# Patient Record
Sex: FEMALE | Race: BLACK OR AFRICAN AMERICAN | NOT HISPANIC OR LATINO | Employment: OTHER | URBAN - METROPOLITAN AREA
[De-identification: names, ages, dates, MRNs, and addresses within clinical notes are randomized per-mention and may not be internally consistent; named-entity substitution may affect disease eponyms.]

---

## 2017-05-05 ENCOUNTER — OFFICE VISIT (OUTPATIENT)
Dept: NEUROLOGY | Facility: CLINIC | Age: 52
End: 2017-05-05
Payer: OTHER GOVERNMENT

## 2017-05-05 ENCOUNTER — LAB VISIT (OUTPATIENT)
Dept: LAB | Facility: HOSPITAL | Age: 52
End: 2017-05-05
Attending: PSYCHIATRY & NEUROLOGY
Payer: OTHER GOVERNMENT

## 2017-05-05 VITALS
HEART RATE: 70 BPM | BODY MASS INDEX: 30.07 KG/M2 | DIASTOLIC BLOOD PRESSURE: 76 MMHG | SYSTOLIC BLOOD PRESSURE: 124 MMHG | HEIGHT: 62 IN | WEIGHT: 163.38 LBS | RESPIRATION RATE: 16 BRPM

## 2017-05-05 DIAGNOSIS — G62.9 POLYNEUROPATHY: ICD-10-CM

## 2017-05-05 DIAGNOSIS — E53.8 B12 DEFICIENCY: ICD-10-CM

## 2017-05-05 DIAGNOSIS — E23.6 PITUITARY CYST: Primary | ICD-10-CM

## 2017-05-05 DIAGNOSIS — M54.17 LUMBOSACRAL RADICULOPATHY AT L5: ICD-10-CM

## 2017-05-05 LAB — VIT B12 SERPL-MCNC: >2000 PG/ML

## 2017-05-05 PROCEDURE — 36415 COLL VENOUS BLD VENIPUNCTURE: CPT

## 2017-05-05 PROCEDURE — 99999 PR PBB SHADOW E&M-EST. PATIENT-LVL III: CPT | Mod: PBBFAC,,, | Performed by: PSYCHIATRY & NEUROLOGY

## 2017-05-05 PROCEDURE — 99213 OFFICE O/P EST LOW 20 MIN: CPT | Mod: PBBFAC | Performed by: PSYCHIATRY & NEUROLOGY

## 2017-05-05 PROCEDURE — 82607 VITAMIN B-12: CPT

## 2017-05-05 PROCEDURE — 99214 OFFICE O/P EST MOD 30 MIN: CPT | Mod: S$PBB,,, | Performed by: PSYCHIATRY & NEUROLOGY

## 2017-05-05 NOTE — PROGRESS NOTES
HPI:Krystal Centeno is a 51 y.o. female with left 4th finger pain and numbness progressing to left hand (especially medial hand/forearm) numbness. Exam showed left arm sensory changes. Some cervical radicular symptoms possible and dorsal hand cyst which is resolved.   2015 EMG left arm normal.   MRI C spine shows no NF narrowing or stenosis. 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 She had a focal injection with that provider which helps.  She has some back pain again and her neuropathy pain is still active- she was given capsaicin cream by another provider which helps somewhat  She feels some difficulty with balance and some difficulty with rising off of a floor with her symptoms over time  [facility] and she is [facility] 300mg Gabapentin TID which helps her pain  Patient's son has myotonic dystrophy thought to be paternally inherited (his father had this condition)      Review of Systems   Constitutional: Negative for fever.   HENT: Negative for nosebleeds.    Eyes: Negative for double vision.   Respiratory: Negative for hemoptysis.    Cardiovascular: Negative for chest pain.   Gastrointestinal: Negative for blood in stool.   Genitourinary: Negative for hematuria.   Musculoskeletal: Positive for back pain. Negative for falls.   Skin: Negative for rash.   Neurological: Negative for tremors and seizures.   Psychiatric/Behavioral: The patient does not have insomnia.             Exam:  Gen Appearance, well developed/nourished in no apparent distress  CV: 2+ distal pulses with no edema or swelling  Neuro:  MS: Awake, alert,  Sustains attention. Recent/remote memory  intact, Language is full to spontaneous speech/naming/comprehension. Fund of Knowledge is full  CN: Optic discs are flat with normal vasculature, PERRL, Extraoccular movements and visual fields are full. Normal facial sensation and strength,  Tongue and Palate are midline and strong. Shoulder Shrug symmetric and strong.  Motor: Normal bulk, tone, no abnormal movements. 5/5 strength bilateral upper/lower extremities with 1+ reflexes  Sensory: symmetric to temp, and vibration.   Cerebellar: Finger-nose,Heal-shin, Rapid alternating movements intact  Gait: normal stance, no ataxia but some difficulty with straight leg raising    ?cystic lesion dorsal hand seen at a last visit,again not seen today      5/2016 MRI brain (repeat study) Stable cystic structure within the posterior sella causing some mild mass effect upon the adjacent pituitary gland, measuring approximately 5.5 x 3.2 mm.  Findings are most suggestive for either a Rathke's cleft cyst versus a cystic pituitary microadenoma.    Age advanced flair hyperintensity throughout the supratentorial white matter the differential to include sequela of chronic microvascular ischemic disease, vasculitis or other demyelinating disease process.      MRI C spine: Mild spondylosis of the cervical spine without evidence for central canal stenosis or neural foraminal narrowing.    The marrow signal is slightly heterogenous on the T1-weighted images. This is a nonspecific finding and can be seen in marrow replacement processes are well defined disease states such as anemia. Correlation with lab reported it is is recommended.        EMG: Normal EMG of the left arm and normal NCS of the   bilateral arms. No evidence of entrapement neuropathy, plexopathy   or radiculopathy on this study    4/2016 EMG/NCS: Impression:  Abnormal Study secondary to the Presence of:    1. 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 Lumbar Radiculopathy at L5 Bilaterally     1/2016 MRI  "L spine: Disc disease at L5    Labs:11/2016: Pituitary labs all normal    Assessment/Plan: Krystal Centeno is a 51 y.o. female with left 4th finger pain and numbness progressing to left hand (especially medial hand/forearm) numbness. Exam showed left arm sensory changes. Some cervical radicular symptoms possible and dorsal hand cyst which is resolved.   2015 EMG left arm normal.   MRI C spine shows no NF narrowing or stenosis. 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 Patient has started po B12 for low normal B12 levels since the neuropathy has been found- Check B12 level today  2.  In addition to this, some rheumatological diagnosis could be   associated with neuropathy.  She is following with rheumatology for "rheumatism- unspecified fibrositis"- injections focally with rheumatology helps  3. She is now s/p Lumbar surgery and initially  Her symptoms of numbness in the LE improved after back surgery.  Patient is on pain medication/ gabapentin for chronic lower back pain and prior left leg pain with pain management.   4. She can return to PT at any point for any worsening gait or balance.  4. C spine MRI reassuring and MRI L spine shows disc disease at L5 2016   5. Pituitary labs normal 2016. Cystic structure in pituitary is stable and could be Rathke's cleft cyst versus a cystic pituitary microadenoma.  By 5/2016 MRI white matter lesions are stable. These are likely incidental findings. Repeat MRI in 2-3 years to ensure no new changes.   6. Dorsal hand cystic lesion appears resolved. Patient's son has myotonic dystrophy thought to be paternally inherited (his father had this condition and patient reports she tested negative)      RTC in 6 months                  "

## 2017-05-05 NOTE — MR AVS SNAPSHOT
[facility]  [address] [address]  Phone: [phone]  Fax: [phone]                  Krystal Centeno   2017 8:00 AM   Office Visit    Description:  Female : 1965   Provider:  Faizan Ryan MD   Department:  [facility]           Reason for Visit     Neurologic Problem           Diagnoses this Visit        Comments    Pituitary cyst    -  Primary     Polyneuropathy         Lumbosacral radiculopathy at L5         B12 deficiency                To Do List           Future Appointments        Provider Department Dept Phone    2017 8:45 AM Faizan Ryan MD [facility] [phone]      Goals (5 Years of Data)     None      Follow-Up and Disposition     Return in about 6 months (around 2017).      [facility] On Call     [facility] On Call Nurse Care Line - 24 Assistance  Unless otherwise directed by your provider, please contact [facility], our nurse care line that is available for  assistance.     Registered nurses in the [facility] On Call Center provide: appointment scheduling, clinical advisement, health education, and other advisory services.  Call: [phone] (toll free)               Medications           Message regarding Medications     Verify the changes and/or additions to your medication regime listed below are the same as discussed with your clinician today.  If any of these changes or additions are incorrect, please notify your healthcare provider.        STOP taking these medications     nifedipine 30 MG ORAL TR24 (PROCARDIA-XL) 30 MG (OSM) 24 hr tablet Take 30 mg by mouth once daily.           Verify that the below list of medications is an accurate representation of the medications you are currently taking.  If none reported, the list may be blank. If incorrect, please contact your healthcare provider. Carry this list with you in case of emergency.           Current Medications      "alendronate (FOSAMAX) 10 MG Tab Take 5 mg by mouth once daily.    aspirin (ECOTRIN) 81 MG EC tablet Take 81 mg by mouth once daily.    atenolol (TENORMIN) 50 MG tablet Take 50 mg by mouth once daily.    CALCIUM CARBONATE/VITAMIN D3 (CALCIUM WITH VITAMIN D ORAL) Take 2 tablets by mouth 2 (two) times daily.    ERGOCALCIFEROL, VITAMIN D2, (VITAMIN D ORAL) Take 300 mg by mouth once daily.    esomeprazole (NEXIUM) 20 MG capsule Take 20 mg by mouth once daily.    ESTRACE 0.01 % (0.1 mg/gram) vaginal cream PLACE 0.5 GRAM VAGINALLY TWICE WEEKLY    folic acid (FOLVITE) 1 MG tablet Take 1 mg by mouth once daily.    gabapentin (NEURONTIN) 300 MG capsule Take 300 mg by mouth 4 (four) times daily.    hydrocodone-acetaminophen 5-325mg (NORCO) 5-325 mg per tablet Take 1 tablet by mouth 2 (two) times daily.    loratadine (CLARITIN) 10 mg tablet Take 10 mg by mouth once daily.     meloxicam (MOBIC) 7.5 MG tablet Take 7.5 mg by mouth once daily.    simvastatin (ZOCOR) 40 MG tablet Take 40 mg by mouth every evening.           Clinical Reference Information           Your Vitals Were     BP Pulse Resp Height Weight BMI    124/76 (BP Location: Right arm, Patient Position: Sitting, BP Method: Manual) 70 16 5' 2" (1.575 m) 74.1 kg (163 lb 5.8 oz) 29.88 kg/m2      Blood Pressure          Most Recent Value    BP  124/76      Allergies as of 5/5/2017     Cyclobenzaprine    Bactrim [Sulfamethoxazole-trimethoprim]      Immunizations Administered on Date of Encounter - 5/5/2017     None      Orders Placed During Today's Visit     Future Labs/Procedures Expected by Expires    Vitamin B12  5/5/2017 5/5/2018      Language Assistance Services     ATTENTION: Language assistance services are available, free of charge. Please call [phone].      ATENCIÓN: Si habla guillaume, tiene a vega disposición servicios gratuitos de asistencia lingüística. Llame al [phone].     CHÚ Ý: N?u b?n nói Ti?ng Vi?t, có các d?ch v? h? tr? ngôn ng? mi?n johnathon mchugh " shannon geller?n. G?i s? [phone].         [facility] complies with applicable Federal civil rights laws and does not discriminate on the basis of race, color, national origin, age, disability, or sex.

## 2017-11-06 ENCOUNTER — OFFICE VISIT (OUTPATIENT)
Dept: NEUROLOGY | Facility: CLINIC | Age: 52
End: 2017-11-06
Payer: OTHER GOVERNMENT

## 2017-11-06 VITALS
SYSTOLIC BLOOD PRESSURE: 122 MMHG | HEART RATE: 80 BPM | BODY MASS INDEX: 30.67 KG/M2 | RESPIRATION RATE: 14 BRPM | WEIGHT: 166.69 LBS | DIASTOLIC BLOOD PRESSURE: 64 MMHG | HEIGHT: 62 IN

## 2017-11-06 DIAGNOSIS — G62.9 POLYNEUROPATHY: ICD-10-CM

## 2017-11-06 DIAGNOSIS — M79.7 FIBROSITIS: ICD-10-CM

## 2017-11-06 DIAGNOSIS — E23.6 PITUITARY CYST: Primary | ICD-10-CM

## 2017-11-06 DIAGNOSIS — E53.8 B12 DEFICIENCY: ICD-10-CM

## 2017-11-06 DIAGNOSIS — M54.17 LUMBOSACRAL RADICULOPATHY AT L5: ICD-10-CM

## 2017-11-06 PROCEDURE — 99999 PR PBB SHADOW E&M-EST. PATIENT-LVL III: CPT | Mod: PBBFAC,,, | Performed by: PSYCHIATRY & NEUROLOGY

## 2017-11-06 PROCEDURE — 99214 OFFICE O/P EST MOD 30 MIN: CPT | Mod: S$PBB,,, | Performed by: PSYCHIATRY & NEUROLOGY

## 2017-11-06 PROCEDURE — 99213 OFFICE O/P EST LOW 20 MIN: CPT | Mod: PBBFAC | Performed by: PSYCHIATRY & NEUROLOGY

## 2017-11-06 RX ORDER — CITALOPRAM 20 MG/1
20 TABLET, FILM COATED ORAL DAILY
COMMUNITY
End: 2017-12-21 | Stop reason: ALTCHOICE

## 2017-11-06 RX ORDER — PRAVASTATIN SODIUM 80 MG/1
80 TABLET ORAL DAILY
COMMUNITY
End: 2019-11-11 | Stop reason: DRUGHIGH

## 2017-11-06 RX ORDER — DICLOFENAC SODIUM 10 MG/G
2 GEL TOPICAL 4 TIMES DAILY
COMMUNITY

## 2017-11-06 RX ORDER — PANTOPRAZOLE SODIUM 40 MG/1
40 TABLET, DELAYED RELEASE ORAL DAILY
COMMUNITY

## 2017-11-06 RX ORDER — NIFEDIPINE 30 MG/1
30 TABLET, FILM COATED, EXTENDED RELEASE ORAL DAILY
COMMUNITY
End: 2018-11-06

## 2017-11-06 NOTE — PROGRESS NOTES
"HPI:Krystal Centeno is a 52 y.o. female with left 4th finger pain and numbness progressing to left hand (especially medial hand/forearm) numbness. Exam showed left arm sensory changes. Some cervical radicular symptoms possible and dorsal hand cyst which is resolved.   2015 EMG left arm normal.   MRI C spine shows no NF narrowing or stenosis. 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 However, she has been gaining weight but stopped off gabapentin and this did not help her loose weight and she could not tolerate off this med.   She states the numbness in the hand is more "pain with weather changes" which occurs in both hands. She is getting relief with injections with Dr Bejarano, rheumatology  She is no longer on hydrocodone for leg pain and [facility] is [facility] lidocaine patches, diclofenac gel,  and mobic for her pain PRN  She reports her balance is improved. The numbness in her feet is usually in the am only and improves during the day.   No nipple discharge, no frequent headaches.    Review of Systems   Constitutional: Negative for fever.   HENT: Negative for nosebleeds.    Eyes: Negative for double vision.   Respiratory: Negative for hemoptysis.    Cardiovascular: Negative for chest pain.   Gastrointestinal: Negative for blood in stool.   Genitourinary: Negative for hematuria.   Musculoskeletal: Positive for back pain. Negative for falls.   Skin: Negative for rash.   Neurological: Negative for tremors.   Psychiatric/Behavioral: Negative for memory loss.       Exam:  Gen Appearance, well developed/nourished in no apparent distress  CV: 2+ distal pulses with no edema or swelling  Neuro:  MS: Awake, alert,  Sustains attention. " Recent/remote memory intact, Language is full to spontaneous speech/naming/comprehension. Fund of Knowledge is full  CN: Optic discs are flat with normal vasculature, PERRL, Extraoccular movements and visual fields are full. Normal facial sensation and strength, chronic right sided hearing loss (seen by ENT prior/ diagnosed with Meniere's disease)  Tongue and Palate are midline and strong. Shoulder Shrug symmetric and strong.  Motor: Normal bulk, tone, no abnormal movements. 5/5 strength bilateral upper/lower extremities with 1+ reflexes  Sensory: symmetric to temp, and vibration.   Cerebellar: Finger-nose,Heal-shin, Rapid alternating movements intact  Gait: normal stance, no ataxia     ?cystic lesion dorsal hand seen at a last visit,again not seen today      5/2016 MRI brain (repeat study) Stable cystic structure within the posterior sella causing some mild mass effect upon the adjacent pituitary gland, measuring approximately 5.5 x 3.2 mm.  Findings are most suggestive for either a Rathke's cleft cyst versus a cystic pituitary microadenoma.    Age advanced flair hyperintensity throughout the supratentorial white matter the differential to include sequela of chronic microvascular ischemic disease, vasculitis or other demyelinating disease process.      MRI C spine: Mild spondylosis of the cervical spine without evidence for central canal stenosis or neural foraminal narrowing.    The marrow signal is slightly heterogenous on the T1-weighted images. This is a nonspecific finding and can be seen in marrow replacement processes are well defined disease states such as anemia. Correlation with lab reported it is is recommended.        EMG: Normal EMG of the left arm and normal NCS of the   bilateral arms. No evidence of entrapement neuropathy, plexopathy   or radiculopathy on this study    4/2016 EMG/NCS: Impression:  Abnormal Study secondary to the Presence of:    1. Mild sensory and Motor Axonal Polyneuropathy in the  "feet  2. Lumbar Radiculopathy at L5 Bilaterally     1/2016 MRI L spine: Disc disease at L5    Labs:5/2017 B12 well corrected    Assessment/Plan: Krystal Centeno is a 52 y.o. female with left 4th finger pain and numbness progressing to left hand (especially medial hand/forearm) numbness. Exam showed left arm sensory changes. Some cervical radicular symptoms possible and dorsal hand cyst which is resolved.   2015 EMG left arm normal.   MRI C spine shows no NF narrowing or stenosis. 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 Patient has started po B12 for low normal B12 levels since the neuropathy has been found- Check B12 level today  2.  In addition to this, some rheumatological diagnosis could be   associated with neuropathy.  She is following with rheumatology for "rheumatism- unspecified fibrositis"- injections focally with rheumatology helps her hand pain (which is pain more than numbness at this point)  3. Initially , her symptoms of numbness in the LE improved after back surgery.  She [facility].   4. She can return to PT at any point for any worsening gait or balance which is improved now  5. She uses gabapentin long term with good relief of LE symptoms.   6. Pituitary labs normal 2016. Cystic structure in pituitary is stable and could be Rathke's cleft cyst versus a cystic pituitary microadenoma.  By 5/2016 MRI white matter lesions are stable. These are likely incidental findings. Repeat MRI in 2-3 years (1924-2608) to ensure no new changes.   7. Dorsal hand cystic lesion is episodic and long standing. Could see hand surgeon at any point if desired/ if persistent. Patient's son has myotonic dystrophy thought to be paternally " inherited (his father had this condition and patient reports she tested negative)    RTC in 1 year

## 2017-12-18 ENCOUNTER — TELEPHONE (OUTPATIENT)
Dept: NEUROLOGY | Facility: CLINIC | Age: 52
End: 2017-12-18

## 2017-12-18 NOTE — TELEPHONE ENCOUNTER
It could be that or her neuropathy. If she is still taking gabapentin as prior and this does not improve soon, she will need to be seen. Me or Shae.  Thanks.

## 2017-12-18 NOTE — TELEPHONE ENCOUNTER
----- Message from Willa Sousa MA sent at 2017 10:50 AM CST -----  Contact: Patient  Krystal Centeno  MRN: [id number]  : 1965  PCP: Dominga Gold  Home Phone      [phone]  Work Phone      Not on file.  Mobile          [phone]      MESSAGE:  Patient states she is having some pain in her feet. She is not due to come back to clinic until another 5 months. Patient is asking for a return her call to [phone].

## 2017-12-18 NOTE — TELEPHONE ENCOUNTER
Offered patient appointment for tomorrow was unable to make it, scheduled for Thursday she will cancel should her symptoms improve.

## 2017-12-18 NOTE — TELEPHONE ENCOUNTER
Spoke with patient she states that for the last couple days her feet feel like two big puffy globs of pain progressively getting worse. She states that [facility] who told her that there was nothing wrong with her feet that her problems must be stemming from her back.

## 2017-12-21 ENCOUNTER — OFFICE VISIT (OUTPATIENT)
Dept: NEUROLOGY | Facility: CLINIC | Age: 52
End: 2017-12-21
Payer: OTHER GOVERNMENT

## 2017-12-21 VITALS
HEIGHT: 63 IN | BODY MASS INDEX: 29.61 KG/M2 | HEART RATE: 80 BPM | RESPIRATION RATE: 16 BRPM | SYSTOLIC BLOOD PRESSURE: 128 MMHG | WEIGHT: 167.13 LBS | DIASTOLIC BLOOD PRESSURE: 78 MMHG

## 2017-12-21 DIAGNOSIS — D35.2 PITUITARY ADENOMA: ICD-10-CM

## 2017-12-21 DIAGNOSIS — G89.29 CHRONIC LOW BACK PAIN WITHOUT SCIATICA, UNSPECIFIED BACK PAIN LATERALITY: Primary | ICD-10-CM

## 2017-12-21 DIAGNOSIS — M54.50 CHRONIC LOW BACK PAIN WITHOUT SCIATICA, UNSPECIFIED BACK PAIN LATERALITY: Primary | ICD-10-CM

## 2017-12-21 DIAGNOSIS — G62.9 POLYNEUROPATHY: ICD-10-CM

## 2017-12-21 DIAGNOSIS — F41.8 DEPRESSION WITH ANXIETY: ICD-10-CM

## 2017-12-21 PROCEDURE — 99214 OFFICE O/P EST MOD 30 MIN: CPT | Mod: PBBFAC | Performed by: NURSE PRACTITIONER

## 2017-12-21 PROCEDURE — 99214 OFFICE O/P EST MOD 30 MIN: CPT | Mod: S$PBB,,, | Performed by: NURSE PRACTITIONER

## 2017-12-21 PROCEDURE — 99999 PR PBB SHADOW E&M-EST. PATIENT-LVL IV: CPT | Mod: PBBFAC,,, | Performed by: NURSE PRACTITIONER

## 2017-12-21 RX ORDER — DULOXETIN HYDROCHLORIDE 30 MG/1
30 CAPSULE, DELAYED RELEASE ORAL DAILY
Qty: 14 CAPSULE | Refills: 0 | Status: SHIPPED | OUTPATIENT
Start: 2017-12-21 | End: 2019-11-11 | Stop reason: DRUGHIGH

## 2017-12-21 RX ORDER — DULOXETIN HYDROCHLORIDE 60 MG/1
60 CAPSULE, DELAYED RELEASE ORAL DAILY
Qty: 30 CAPSULE | Refills: 11 | Status: SHIPPED | OUTPATIENT
Start: 2017-12-21 | End: 2017-12-21 | Stop reason: SDUPTHER

## 2017-12-21 RX ORDER — DULOXETIN HYDROCHLORIDE 60 MG/1
60 CAPSULE, DELAYED RELEASE ORAL DAILY
Qty: 30 CAPSULE | Refills: 11 | Status: SHIPPED | OUTPATIENT
Start: 2017-12-21 | End: 2018-11-06 | Stop reason: DRUGHIGH

## 2017-12-21 NOTE — PATIENT INSTRUCTIONS
Start Duloxetine 30 mg 1 capsule each day for 2 weeks.   Then increase to 60 mg capsule afterwards.     To wean Celexa (Citalopram):  Take 1/2 tablet for 2 weeks, then stop.

## 2017-12-21 NOTE — PROGRESS NOTES
HPI:Krystal Centeno is a 52 y.o. female with left 4th finger pain and numbness progressing to left hand (especially medial hand/forearm) numbness. Exam showed left arm sensory changes. Some cervical radicular symptoms possible and dorsal hand cyst which is resolved. 2015 EMG left arm normal. MRI C spine shows no NF narrowing or stenosis. 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     She presents today with complaint of worsening of her bilateral foot pain and numbness. She has more foot pain during her initial steps after getting up to walk. She has to dangle her feet over the side of the bed for some time upon waking to gain more feeling in them.     Her hand pain is managed with injections per Rheumatology.     Lumbar pain ongoing, but managed with [facility].    She denies nipple discharge/lactation.    Review of Systems   Constitutional: Negative for fever.   HENT: Negative for nosebleeds.    Eyes: Negative for double vision.   Respiratory: Negative for hemoptysis.    Cardiovascular: Negative for chest pain.   Gastrointestinal: Negative for blood in stool.   Genitourinary: Negative for hematuria.   Musculoskeletal: Positive for back pain and joint pain. Negative for falls.   Skin: Negative for rash.   Neurological: Positive for sensory change. Negative for tremors.   Psychiatric/Behavioral: Negative for memory loss.     Exam:  Gen Appearance, well developed/nourished in no apparent distress  CV: 2+ distal pulses with no edema or swelling  Neuro:  MS: Awake, alert,  Sustains attention. Recent/remote memory intact, Language is full to spontaneous speech/naming/comprehension. Fund of Knowledge is full  CN: Optic discs are flat with normal vasculature, PERRL, Extraoccular movements and visual fields  are full. Normal facial sensation and strength, chronic right sided hearing loss (seen by ENT prior/ diagnosed with Meniere's disease)  Tongue and Palate are midline and strong. Shoulder Shrug symmetric and strong.  Motor: Normal bulk, tone, no abnormal movements. 5/5 strength bilateral upper/lower extremities with 1+ reflexes  Sensory: symmetric to temp, and vibration.   Cerebellar: Finger-nose,Heal-shin, Rapid alternating movements intact  Gait: normal stance, no ataxia   MSK Survey:?cystic lesion dorsal hand seen at a last visit,again not seen today    Imagin2016 MRI brain (repeat study) Stable cystic structure within the posterior sella causing some mild mass effect upon the adjacent pituitary gland, measuring approximately 5.5 x 3.2 mm.  Findings are most suggestive for either a Rathke's cleft cyst versus a cystic pituitary microadenoma.    Age advanced flair hyperintensity throughout the supratentorial white matter the differential to include sequela of chronic microvascular ischemic disease, vasculitis or other demyelinating disease process.    MRI C spine 2015: Mild spondylosis of the cervical spine without evidence for central canal stenosis or neural foraminal narrowing.    The marrow signal is slightly heterogenous on the T1-weighted images. This is a nonspecific finding and can be seen in marrow replacement processes are well defined disease states such as anemia. Correlation with lab reported it is is recommended.    2016 MRI L spine: Disc disease at L5    EMG 2015: Normal EMG of the left arm and normal NCS of the   bilateral arms. No evidence of entrapement neuropathy, plexopathy   or radiculopathy on this study    2016 EMG/NCS: Impression:  Abnormal Study secondary to the Presence of:    1. 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 Lumbar Radiculopathy at L5 Bilaterally     Labs: 2017 B12 well corrected    Assessment/Plan: Krystal Centeno is a 52 y.o. female  "with left 4th finger pain and numbness progressing to left hand (especially medial hand/forearm) numbness. Exam showed left arm sensory changes. Some cervical radicular symptoms possible and dorsal hand cyst which is resolved. 2015 EMG left arm normal. MRI C spine shows no NF narrowing or stenosis. 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 And Lumbar Radiculopathy at L5 Bilaterally--now S/p L5 Surgery in 8/2016      I recommend:   1. Wean Celexa and start Cymbalta for neuropathic complaints, as well as her anxiety and depression. Continue Gabapentin for neuropathy, as well as LE pain.   2. Continue B12 supplementation, and check level at next visit.   3. Some rheumatological diagnosis could be   associated with neuropathy. She is following with rheumatology for "rheumatism- unspecified fibrositis"- injections focally with rheumatology helps her hand pain (which is pain more than numbness at this point).  4. Initially , her symptoms of numbness in the LE improved after back surgery.  She is [facility].   5. She can return to PT at any point for any worsening gait or balance which is improved now.  6. Pituitary labs normal 2016. Cystic structure in pituitary is stable and could be Rathke's cleft cyst versus a cystic pituitary microadenoma.  By 5/2016 MRI white matter lesions are stable. These are likely incidental findings. Repeat MRI in 2-3 years ([id number]) to ensure no new changes.   7. Dorsal hand cystic lesion is episodic and long standing. Could see hand surgeon at any point if desired/ if persistent. Patient's son has myotonic dystrophy thought to be paternally inherited (his father had this condition and patient reports she tested negative).    FU 2 months.                     "

## 2018-11-06 ENCOUNTER — OFFICE VISIT (OUTPATIENT)
Dept: NEUROLOGY | Facility: CLINIC | Age: 53
End: 2018-11-06
Payer: OTHER GOVERNMENT

## 2018-11-06 ENCOUNTER — LAB VISIT (OUTPATIENT)
Dept: LAB | Facility: HOSPITAL | Age: 53
End: 2018-11-06
Attending: NURSE PRACTITIONER
Payer: OTHER GOVERNMENT

## 2018-11-06 VITALS
SYSTOLIC BLOOD PRESSURE: 128 MMHG | WEIGHT: 179 LBS | DIASTOLIC BLOOD PRESSURE: 82 MMHG | HEIGHT: 62 IN | RESPIRATION RATE: 16 BRPM | BODY MASS INDEX: 32.94 KG/M2 | HEART RATE: 74 BPM

## 2018-11-06 DIAGNOSIS — D35.2 PITUITARY ADENOMA: ICD-10-CM

## 2018-11-06 DIAGNOSIS — E78.5 HYPERLIPIDEMIA, UNSPECIFIED HYPERLIPIDEMIA TYPE: ICD-10-CM

## 2018-11-06 DIAGNOSIS — G62.9 POLYNEUROPATHY: ICD-10-CM

## 2018-11-06 DIAGNOSIS — E53.8 B12 DEFICIENCY: ICD-10-CM

## 2018-11-06 DIAGNOSIS — D35.2 PITUITARY ADENOMA: Primary | ICD-10-CM

## 2018-11-06 DIAGNOSIS — F41.8 DEPRESSION WITH ANXIETY: ICD-10-CM

## 2018-11-06 DIAGNOSIS — I10 ESSENTIAL HYPERTENSION: ICD-10-CM

## 2018-11-06 DIAGNOSIS — M54.50 CHRONIC LOW BACK PAIN WITHOUT SCIATICA, UNSPECIFIED BACK PAIN LATERALITY: ICD-10-CM

## 2018-11-06 DIAGNOSIS — G89.29 CHRONIC LOW BACK PAIN WITHOUT SCIATICA, UNSPECIFIED BACK PAIN LATERALITY: ICD-10-CM

## 2018-11-06 LAB
ALBUMIN SERPL BCP-MCNC: 3.9 G/DL
ALP SERPL-CCNC: 60 U/L
ALT SERPL W/O P-5'-P-CCNC: 17 U/L
ANION GAP SERPL CALC-SCNC: 10 MMOL/L
AST SERPL-CCNC: 22 U/L
BASOPHILS # BLD AUTO: 0.02 K/UL
BASOPHILS NFR BLD: 0.3 %
BILIRUB SERPL-MCNC: 0.3 MG/DL
BUN SERPL-MCNC: 9 MG/DL
CALCIUM SERPL-MCNC: 9.7 MG/DL
CHLORIDE SERPL-SCNC: 102 MMOL/L
CO2 SERPL-SCNC: 26 MMOL/L
CREAT SERPL-MCNC: 0.8 MG/DL
DIFFERENTIAL METHOD: ABNORMAL
EOSINOPHIL # BLD AUTO: 0.2 K/UL
EOSINOPHIL NFR BLD: 3.6 %
ERYTHROCYTE [DISTWIDTH] IN BLOOD BY AUTOMATED COUNT: 13.8 %
EST. GFR  (AFRICAN AMERICAN): >60 ML/MIN/1.73 M^2
EST. GFR  (NON AFRICAN AMERICAN): >60 ML/MIN/1.73 M^2
GLUCOSE SERPL-MCNC: 95 MG/DL
HCT VFR BLD AUTO: 37.9 %
HGB BLD-MCNC: 12.2 G/DL
LYMPHOCYTES # BLD AUTO: 3.1 K/UL
LYMPHOCYTES NFR BLD: 45.9 %
MCH RBC QN AUTO: 29 PG
MCHC RBC AUTO-ENTMCNC: 32.2 G/DL
MCV RBC AUTO: 90 FL
MONOCYTES # BLD AUTO: 0.4 K/UL
MONOCYTES NFR BLD: 6 %
NEUTROPHILS # BLD AUTO: 3 K/UL
NEUTROPHILS NFR BLD: 44.2 %
PLATELET # BLD AUTO: 281 K/UL
PMV BLD AUTO: 8.5 FL
POTASSIUM SERPL-SCNC: 4.8 MMOL/L
PROT SERPL-MCNC: 7.9 G/DL
RBC # BLD AUTO: 4.2 M/UL
SODIUM SERPL-SCNC: 138 MMOL/L
T4 SERPL-MCNC: 8 UG/DL
TSH SERPL DL<=0.005 MIU/L-ACNC: 1.08 UIU/ML
WBC # BLD AUTO: 6.71 K/UL

## 2018-11-06 PROCEDURE — 99999 PR PBB SHADOW E&M-EST. PATIENT-LVL IV: CPT | Mod: PBBFAC,,, | Performed by: NURSE PRACTITIONER

## 2018-11-06 PROCEDURE — 83001 ASSAY OF GONADOTROPIN (FSH): CPT

## 2018-11-06 PROCEDURE — 80053 COMPREHEN METABOLIC PANEL: CPT

## 2018-11-06 PROCEDURE — 83002 ASSAY OF GONADOTROPIN (LH): CPT

## 2018-11-06 PROCEDURE — 36415 COLL VENOUS BLD VENIPUNCTURE: CPT

## 2018-11-06 PROCEDURE — 99214 OFFICE O/P EST MOD 30 MIN: CPT | Mod: PBBFAC | Performed by: NURSE PRACTITIONER

## 2018-11-06 PROCEDURE — 84436 ASSAY OF TOTAL THYROXINE: CPT

## 2018-11-06 PROCEDURE — 82024 ASSAY OF ACTH: CPT

## 2018-11-06 PROCEDURE — 99214 OFFICE O/P EST MOD 30 MIN: CPT | Mod: S$PBB,,, | Performed by: NURSE PRACTITIONER

## 2018-11-06 PROCEDURE — 85025 COMPLETE CBC W/AUTO DIFF WBC: CPT

## 2018-11-06 PROCEDURE — 82607 VITAMIN B-12: CPT

## 2018-11-06 PROCEDURE — 84146 ASSAY OF PROLACTIN: CPT

## 2018-11-06 PROCEDURE — 84443 ASSAY THYROID STIM HORMONE: CPT

## 2018-11-06 NOTE — PROGRESS NOTES
HPI:Krystal Centeno is a 53 y.o. female with left 4th finger pain and numbness progressing to left hand (especially medial hand/forearm) numbness. Exam showed left arm sensory changes. Some cervical radicular symptoms possible and dorsal hand cyst which is resolved. 2015 EMG left arm normal. MRI C spine shows no NF narrowing or stenosis. 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 She is retired [employer].     She presents today to [facility]. She was last seen in 12/2017, and had her Celexa changed to Cymbalta for better control of her neuropathic complaints, in addition to her anxiety and depression. She was lost to follow up afterwards. Foot pain is improved with the Cymbalta, and anxiety and depression are well controlled. She was unable to titrate to 60 mg, due to excessive fatigue.     Her hand pain is managed with injections per Rheumatology.     Lumbar pain ongoing, but [facility] Pain is worse with weather changes.     She denies nipple discharge/lactation at this time.     Review of Systems   Constitutional: Negative for fever.   HENT: Negative for nosebleeds.    Eyes: Negative for double vision.   Respiratory: Negative for hemoptysis.    Cardiovascular: Negative for chest pain.   Gastrointestinal: Negative for blood in stool.   Genitourinary: Negative for hematuria.   Musculoskeletal: Positive for back pain and joint pain. Negative for falls.   Skin: Negative for rash.   Neurological: Positive for sensory change. Negative for tremors.   Psychiatric/Behavioral: Negative for memory loss.     Exam:  Gen Appearance, well developed/nourished in no apparent distress  CV: 2+ distal pulses with no edema or swelling  Neuro:  MS: Awake, alert,  Sustains attention. Recent/remote memory  intact, Language is full to spontaneous speech/naming/comprehension. Fund of Knowledge is full  CN: Optic discs are flat with normal vasculature, PERRL, Extraoccular movements and visual fields are full. Normal facial sensation and strength, chronic right sided hearing loss (seen by ENT prior/ diagnosed with Meniere's disease)  Tongue and Palate are midline and strong. Shoulder Shrug symmetric and strong.  Motor: Normal bulk, tone, no abnormal movements. 5/5 strength bilateral upper/lower extremities with 1+ reflexes  Sensory: symmetric to temp, and vibration.   Cerebellar: Finger-nose,Heal-shin, Rapid alternating movements intact  Gait: normal stance, no ataxia   MSK Survey: ?cystic lesion dorsal hand seen at a last visit, again not seen today    Imagin2016 MRI brain (repeat study) Stable cystic structure within the posterior sella causing some mild mass effect upon the adjacent pituitary gland, measuring approximately 5.5 x 3.2 mm.  Findings are most suggestive for either a Rathke's cleft cyst versus a cystic pituitary microadenoma.    Age advanced flair hyperintensity throughout the supratentorial white matter the differential to include sequela of chronic microvascular ischemic disease, vasculitis or other demyelinating disease process.    MRI C spine 2015: Mild spondylosis of the cervical spine without evidence for central canal stenosis or neural foraminal narrowing.    The marrow signal is slightly heterogenous on the T1-weighted images. This is a nonspecific finding and can be seen in marrow replacement processes are well defined disease states such as anemia. Correlation with lab reported it is is recommended.    2016 MRI L spine: Disc disease at L5    EMG 2015: Normal EMG of the left arm and normal NCS of the   bilateral arms. No evidence of entrapement neuropathy, plexopathy   or radiculopathy on this study    2016 EMG/NCS: Impression:  Abnormal Study secondary to the Presence of:    1. Mild  "sensory and Motor Axonal Polyneuropathy in the feet  2. Lumbar Radiculopathy at L5 Bilaterally     Labs: 5/2017 B12 well corrected    Assessment/Plan: Krystal Centeno is a 53 y.o. female with left 4th finger pain and numbness progressing to left hand (especially medial hand/forearm) numbness. Exam showed left arm sensory changes. Some cervical radicular symptoms possible and dorsal hand cyst which is resolved. 2015 EMG left arm normal. MRI C spine shows no NF narrowing or stenosis. 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 And Lumbar Radiculopathy at L5 Bilaterally--now S/p L5 Surgery in 8/2016. She is retired [employer].     I recommend:   1. Continue Cymbalta for neuropathic complaints, as well as her anxiety and depression. Continue Gabapentin for neuropathy, as well as LE pain.   2. Continue B12 supplementation, and check level at next visit.   3. Some rheumatological diagnosis could be associated with neuropathy. She is following with rheumatology for "rheumatism- unspecified fibrositis"- injections focally with rheumatology helps her hand pain (which is pain more than numbness at this point).  4. Initially, her symptoms of numbness in the LE improved after back surgery.  She is [facility].   5. She can return to PT at any point for any worsening gait or balance which is improved now.  6. Repeat Pituitary labs at this time. Pituitary labs normal 2016. Cystic structure in pituitary is stable and could be Rathke's cleft cyst versus a cystic pituitary microadenoma.  By 5/2016 MRI white matter lesions are stable. These are likely incidental findings. Repeat MRI at this time to ensure stability.   7. Dorsal hand cystic lesion is episodic and long standing. Could see hand surgeon at any point if desired/ if persistent. Patient's son has " myotonic dystrophy thought to be paternally inherited (his father had this condition and patient reports she tested negative).    FU 6 months.

## 2018-11-07 LAB
FSH SERPL-ACNC: 85.9 MIU/ML
LH SERPL-ACNC: 40.3 MIU/ML
PROLACTIN SERPL IA-MCNC: 9.5 NG/ML
VIT B12 SERPL-MCNC: >2000 PG/ML

## 2018-11-09 LAB — ACTH PLAS-MCNC: 14 PG/ML

## 2018-11-14 ENCOUNTER — HOSPITAL ENCOUNTER (OUTPATIENT)
Dept: RADIOLOGY | Facility: HOSPITAL | Age: 53
Discharge: HOME | End: 2018-11-14
Attending: NURSE PRACTITIONER
Payer: OTHER GOVERNMENT

## 2018-11-14 DIAGNOSIS — D35.2 PITUITARY ADENOMA: Primary | ICD-10-CM

## 2018-11-14 DIAGNOSIS — D35.2 PITUITARY ADENOMA: ICD-10-CM

## 2018-11-20 ENCOUNTER — HOSPITAL ENCOUNTER (OUTPATIENT)
Dept: RADIOLOGY | Facility: HOSPITAL | Age: 53
Discharge: HOME | End: 2018-11-20
Attending: NURSE PRACTITIONER
Payer: OTHER GOVERNMENT

## 2018-11-20 DIAGNOSIS — D35.2 PITUITARY ADENOMA: ICD-10-CM

## 2018-11-20 PROCEDURE — 25500020 PHARM REV CODE 255: Performed by: NURSE PRACTITIONER

## 2018-11-20 PROCEDURE — 70553 MRI BRAIN STEM W/O & W/DYE: CPT | Mod: 26,,, | Performed by: RADIOLOGY

## 2018-11-20 PROCEDURE — A9585 GADOBUTROL INJECTION: HCPCS | Performed by: NURSE PRACTITIONER

## 2018-11-20 PROCEDURE — 70553 MRI BRAIN STEM W/O & W/DYE: CPT | Mod: TC

## 2018-11-20 RX ORDER — GADOBUTROL 604.72 MG/ML
8 INJECTION INTRAVENOUS
Status: COMPLETED | OUTPATIENT
Start: 2018-11-20 | End: 2018-11-20

## 2018-11-20 RX ADMIN — GADOBUTROL 8 ML: 604.72 INJECTION INTRAVENOUS at 10:11

## 2019-05-08 ENCOUNTER — OFFICE VISIT (OUTPATIENT)
Dept: NEUROLOGY | Facility: CLINIC | Age: 54
End: 2019-05-08
Payer: OTHER GOVERNMENT

## 2019-05-08 VITALS
HEIGHT: 62 IN | SYSTOLIC BLOOD PRESSURE: 104 MMHG | WEIGHT: 162.94 LBS | RESPIRATION RATE: 14 BRPM | DIASTOLIC BLOOD PRESSURE: 62 MMHG | HEART RATE: 70 BPM | BODY MASS INDEX: 29.98 KG/M2

## 2019-05-08 DIAGNOSIS — G89.29 CHRONIC LOW BACK PAIN WITHOUT SCIATICA, UNSPECIFIED BACK PAIN LATERALITY: ICD-10-CM

## 2019-05-08 DIAGNOSIS — K21.9 GASTROESOPHAGEAL REFLUX DISEASE, ESOPHAGITIS PRESENCE NOT SPECIFIED: ICD-10-CM

## 2019-05-08 DIAGNOSIS — E78.5 HYPERLIPIDEMIA, UNSPECIFIED HYPERLIPIDEMIA TYPE: ICD-10-CM

## 2019-05-08 DIAGNOSIS — M54.50 CHRONIC LOW BACK PAIN WITHOUT SCIATICA, UNSPECIFIED BACK PAIN LATERALITY: ICD-10-CM

## 2019-05-08 DIAGNOSIS — F41.8 DEPRESSION WITH ANXIETY: ICD-10-CM

## 2019-05-08 DIAGNOSIS — I10 ESSENTIAL HYPERTENSION: ICD-10-CM

## 2019-05-08 DIAGNOSIS — G62.9 POLYNEUROPATHY: Primary | ICD-10-CM

## 2019-05-08 DIAGNOSIS — I67.2 CEREBRAL ATHEROSCLEROSIS: ICD-10-CM

## 2019-05-08 DIAGNOSIS — D35.2 PITUITARY ADENOMA: ICD-10-CM

## 2019-05-08 PROCEDURE — 99999 PR PBB SHADOW E&M-EST. PATIENT-LVL IV: ICD-10-PCS | Mod: PBBFAC,,, | Performed by: NURSE PRACTITIONER

## 2019-05-08 PROCEDURE — 99214 OFFICE O/P EST MOD 30 MIN: CPT | Mod: S$PBB,,, | Performed by: NURSE PRACTITIONER

## 2019-05-08 PROCEDURE — 99999 PR PBB SHADOW E&M-EST. PATIENT-LVL IV: CPT | Mod: PBBFAC,,, | Performed by: NURSE PRACTITIONER

## 2019-05-08 PROCEDURE — 99214 OFFICE O/P EST MOD 30 MIN: CPT | Mod: PBBFAC | Performed by: NURSE PRACTITIONER

## 2019-05-08 PROCEDURE — 99214 PR OFFICE/OUTPT VISIT, EST, LEVL IV, 30-39 MIN: ICD-10-PCS | Mod: S$PBB,,, | Performed by: NURSE PRACTITIONER

## 2019-05-08 NOTE — PROGRESS NOTES
HPI:Krystal Centeno is a 53 y.o. female with left 4th finger pain and numbness progressing to left hand (especially medial hand/forearm) numbness. Exam showed left arm sensory changes. Some cervical radicular symptoms possible and dorsal hand cyst which is resolved. 2015 EMG left arm normal. MRI C spine shows no NF narrowing or stenosis. 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 She is retired [employer]. She has HTN, HLD, and GERD.     She presents today for a scheduled follow up visit. Cymbalta continues to be moderately helpful for her neuropathic complaints. No falls or gait imbalance. She also takes [facility]. Depression and anxiety are helped with Cymbalta as well.     She is using CBD cream, as well as Diclofenac and Lidocaine cream per [facility], which is helpful.     PT helpful during sessions for her musculoskeletal complaints. Lumbar pain ongoing, but managed with [facility]. Pain is worse with weather changes.     She denies nipple discharge/lactation at this time. Repeat MRI Brain in 11/2018 overall normal.     Review of Systems   Constitutional: Negative for fever.   HENT: Negative for nosebleeds.    Eyes: Negative for double vision.   Respiratory: Negative for hemoptysis.    Cardiovascular: Negative for chest pain.   Gastrointestinal: Negative for blood in stool.   Genitourinary: Negative for hematuria.   Musculoskeletal: Positive for back pain and joint pain. Negative for falls.   Skin: Negative for rash.   Neurological: Positive for sensory change. Negative for tremors.   Psychiatric/Behavioral: Negative for memory loss.     Exam:  Gen Appearance, well developed/nourished in no apparent distress  CV: 2+ distal pulses with no edema or swelling  Neuro:  MS: Awake, alert,   Sustains attention. Recent/remote memory intact, Language is full to spontaneous speech/naming/comprehension. Fund of Knowledge is full  CN: Optic discs are flat with normal vasculature, PERRL, Extraoccular movements and visual fields are full. Normal facial sensation and strength, chronic right sided hearing loss (seen by ENT prior/ diagnosed with Meniere's disease)  Tongue and Palate are midline and strong. Shoulder Shrug symmetric and strong.  Motor: Normal bulk, tone, no abnormal movements. 5/5 strength bilateral upper/lower extremities with 1+ reflexes  Sensory: symmetric to temp, and vibration.   Cerebellar: Finger-nose,Heal-shin, Rapid alternating movements intact  Gait: normal stance, no ataxia   MSK Survey: ? cystic lesion dorsal hand seen at a last visit, again not seen today    Imagin2018 MRI Brain:   Stable nonenhancing cystic structure in the posterior sella measuring 5 x 3 mm.  No new lesion detected.    Continued age advanced T2/FLAIR signal abnormality throughout the supratentorial white matter without corresponding diffusion restriction.  While this could simply represent age advanced chronic microvascular ischemic change, underlying demyelinating disease process not entirely excluded.    2016 MRI Brain (repeat study) Stable cystic structure within the posterior sella causing some mild mass effect upon the adjacent pituitary gland, measuring approximately 5.5 x 3.2 mm.  Findings are most suggestive for either a Rathke's cleft cyst versus a cystic pituitary microadenoma.    Age advanced flair hyperintensity throughout the supratentorial white matter the differential to include sequela of chronic microvascular ischemic disease, vasculitis or other demyelinating disease process.    MRI C spine 2015:   Mild spondylosis of the cervical spine without evidence for central canal stenosis or neural foraminal narrowing.    The marrow signal is slightly heterogenous on the T1-weighted images. This  "is a nonspecific finding and can be seen in marrow replacement processes are well defined disease states such as anemia. Correlation with lab reported it is is recommended.    1/2016 MRI L spine:   Disc disease at L5    EMG 5/2015:   Normal EMG of the left arm and normal NCS of the   bilateral arms. No evidence of entrapement neuropathy, plexopathy   or radiculopathy on this study    4/2016 EMG/NCS:   Impression:  Abnormal Study secondary to the Presence of:    1. 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 Lumbar Radiculopathy at L5 Bilaterally     Labs:   5/2017 B12 well corrected  11/2018 B12 well corrected, FSH, prolactin, LH, ACTH, T4, TSH, CBC, CMP, WNL    Assessment/Plan: Krystal Centeno is a 53 y.o. female with left 4th finger pain and numbness progressing to left hand (especially medial hand/forearm) numbness. Exam showed left arm sensory changes. Some cervical radicular symptoms possible and dorsal hand cyst which is resolved. 2015 EMG left arm normal. MRI C spine shows no NF narrowing or stenosis. 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 And Lumbar Radiculopathy at L5 Bilaterally--now S/p L5 Surgery in 8/2016. She is retired [employer].     I recommend:   1. Continue Cymbalta for neuropathic complaints, as well as her anxiety and depression. Continue Gabapentin for neuropathy, as well as LE pain ([facility]). She also uses CBD cream and compound cream [facility].    2. Continue B12 supplementation.   3. Some rheumatological diagnosis could be associated with neuropathy. She saw rheumatology prior for "rheumatism- unspecified fibrositis"- injections focally with rheumatology helps her hand pain (which is pain more than numbness at this point).  4. Initially, her symptoms of numbness in the LE improved after back surgery.  She is managed with symptomatic " [facility]arcotic [facility] for [facility]. She also goes to PT, which is helpful.   5. She can return to PT at any point for any worsening gait or balance which is improved now.  6. Repeat Pituitary labs normal in 11/2018 and in 2016. Cystic structure in pituitary is stable and could be Rathke's cleft cyst versus a cystic pituitary microadenoma.  By 11/2018 MRI white matter lesions are stable, as well as cystic structure. These are likely incidental findings. Repeat MRI at in 2-3 years or with new complaints.   7. Dorsal hand cystic lesion is episodic and long standing. Could see hand surgeon at any point if desired/ if persistent. Patient's son has myotonic dystrophy thought to be paternally inherited (his father had this condition and patient reports she tested negative).    FU 6 months

## 2019-05-08 NOTE — PATIENT INSTRUCTIONS
Over the counter supplements to try for Neuropathy:   L-glutamine-500 to 1000 mg per day    Or alpha lipoic acid 600 mg per day    Try separately and for 3 months at a time.

## 2019-11-11 ENCOUNTER — OFFICE VISIT (OUTPATIENT)
Dept: NEUROLOGY | Facility: CLINIC | Age: 54
End: 2019-11-11
Payer: OTHER GOVERNMENT

## 2019-11-11 VITALS
HEART RATE: 80 BPM | HEIGHT: 62 IN | WEIGHT: 171.94 LBS | DIASTOLIC BLOOD PRESSURE: 70 MMHG | BODY MASS INDEX: 31.64 KG/M2 | RESPIRATION RATE: 14 BRPM | SYSTOLIC BLOOD PRESSURE: 118 MMHG

## 2019-11-11 DIAGNOSIS — G62.9 POLYNEUROPATHY: Primary | ICD-10-CM

## 2019-11-11 DIAGNOSIS — Q89.2: ICD-10-CM

## 2019-11-11 DIAGNOSIS — E53.8 B12 DEFICIENCY: ICD-10-CM

## 2019-11-11 DIAGNOSIS — M54.16 LUMBAR RADICULAR PAIN: ICD-10-CM

## 2019-11-11 DIAGNOSIS — F41.8 DEPRESSION WITH ANXIETY: ICD-10-CM

## 2019-11-11 PROCEDURE — 99999 PR PBB SHADOW E&M-EST. PATIENT-LVL III: CPT | Mod: PBBFAC,,, | Performed by: PSYCHIATRY & NEUROLOGY

## 2019-11-11 PROCEDURE — 99214 OFFICE O/P EST MOD 30 MIN: CPT | Mod: S$PBB,,, | Performed by: PSYCHIATRY & NEUROLOGY

## 2019-11-11 PROCEDURE — 99214 PR OFFICE/OUTPT VISIT, EST, LEVL IV, 30-39 MIN: ICD-10-PCS | Mod: S$PBB,,, | Performed by: PSYCHIATRY & NEUROLOGY

## 2019-11-11 PROCEDURE — 99213 OFFICE O/P EST LOW 20 MIN: CPT | Mod: PBBFAC | Performed by: PSYCHIATRY & NEUROLOGY

## 2019-11-11 PROCEDURE — 99999 PR PBB SHADOW E&M-EST. PATIENT-LVL III: ICD-10-PCS | Mod: PBBFAC,,, | Performed by: PSYCHIATRY & NEUROLOGY

## 2019-11-11 RX ORDER — DULOXETIN HYDROCHLORIDE 60 MG/1
60 CAPSULE, DELAYED RELEASE ORAL DAILY
COMMUNITY

## 2019-11-11 RX ORDER — PRAVASTATIN SODIUM 40 MG/1
20 TABLET ORAL DAILY
COMMUNITY
End: 2023-04-24

## 2019-11-11 NOTE — PROGRESS NOTES
HPI:Krystal Centeno is a 52 y.o. female with left 4th finger pain and numbness progressing to left hand (especially medial hand/forearm) numbness. Exam showed left arm sensory changes. Some cervical radicular symptoms possible and dorsal hand cyst which is resolved.   2015 EMG left arm normal.   MRI C spine shows no NF narrowing or stenosis. 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  She does continue to have some variable pain. [facility]/radicular symptoms recently-  [facility]CE currently    Not bothered by cystic hand lesion- not noted currently by patient    Review of Systems   Constitutional: Negative for fever.   HENT: Negative for nosebleeds.    Eyes: Negative for double vision.   Respiratory: Negative for hemoptysis.    Cardiovascular: Negative for chest pain.   Gastrointestinal: Negative for blood in stool.   Genitourinary: Negative for hematuria.   Musculoskeletal: Positive for back pain. Negative for falls.   Skin: Negative for rash.   Neurological: Negative for tremors.   Psychiatric/Behavioral: Negative for memory loss.       Exam:  Gen Appearance, well developed/nourished in no apparent distress  CV: 2+ distal pulses with no edema or swelling  Neuro:  MS: Awake, alert,  Sustains attention. Recent/remote memory intact, Language is full to spontaneous speech/naming/comprehension. Fund of Knowledge is full  CN: Optic discs are flat with normal vasculature, PERRL, Extraoccular movements and visual fields are full. Normal facial sensation and strength, chronic right sided hearing loss (seen by ENT prior/  diagnosed with Meniere's disease)  Tongue and Palate are midline and strong. Shoulder Shrug symmetric and strong.  Motor: Normal bulk, tone, no abnormal movements. 5/5 strength bilateral upper/lower extremities with 1+ reflexes  Sensory: symmetric to temp, and vibration.   Cerebellar: Finger-nose,Heal-shin, Rapid alternating movements intact  Gait: normal stance, no ataxia     ?cystic lesion dorsal hand seen at a last visit,again not seen today      5/2016 MRI brain (repeat study) Stable cystic structure within the posterior sella causing some mild mass effect upon the adjacent pituitary gland, measuring approximately 5.5 x 3.2 mm.  Findings are most suggestive for either a Rathke's cleft cyst versus a cystic pituitary microadenoma.    Age advanced flair hyperintensity throughout the supratentorial white matter the differential to include sequela of chronic microvascular ischemic disease, vasculitis or other demyelinating disease process.      MRI C spine: Mild spondylosis of the cervical spine without evidence for central canal stenosis or neural foraminal narrowing.    The marrow signal is slightly heterogenous on the T1-weighted images. This is a nonspecific finding and can be seen in marrow replacement processes are well defined disease states such as anemia. Correlation with lab reported it is is recommended.        EMG: Normal EMG of the left arm and normal NCS of the   bilateral arms. No evidence of entrapement neuropathy, plexopathy   or radiculopathy on this study    4/2016 EMG/NCS: Impression:  Abnormal Study secondary to the Presence of:    1. 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 Lumbar Radiculopathy at L5 Bilaterally     1/2016 MRI L spine: Disc disease at L5    MRI brain 2018: Stable nonenhancing cystic structure in the posterior sella measuring 5 x 3 mm.  No new lesion detected.    Continued age advanced T2/FLAIR signal abnormality throughout the supratentorial white matter  "without corresponding diffusion restriction.  While this could simply represent age advanced chronic microvascular ischemic change, underlying demyelinating disease process not entirely excluded.    Labs:5/2017 and 2018 B12 well corrected    Assessment/Plan: Krystal Centeno is a 54 y.o. female with left 4th finger pain and numbness progressing to left hand (especially medial hand/forearm) numbness. Exam showed left arm sensory changes. Some cervical radicular symptoms possible and dorsal hand cyst which is resolved.   2015 EMG left arm normal.   2015 MRI C spine showed no NF narrowing or stenosis. 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 Continue Cymbalta for neuropathic complaints, as well as her anxiety and depression. Continue Gabapentin for neuropathy, as well as LE pain ([facility]). She also uses CBD cream and compound cream [facility].    2. Continue B12 supplementation for lower normal levels prior. Follow B12 level at the next visit  3. Some rheumatological diagnosis could be associated with neuropathy. She saw rheumatology prior for "rheumatism- unspecified fibrositis"- injections focally with rheumatology helps her hand pain (which is pain more than numbness at this point).  -[facility]  4. Initially, her symptoms of numbness in the LE improved after back surgery.  She is managed with symptomatic non-[facility]arcotic [facility]s [facility] for [facility]. She also goes to PT PRN, which is helpful.   -[facility] for lumbar pain/radicular symptoms recently and is pending DULCE currently  5. Repeat Pituitary labs were normal in 11/2018 and in 2016. Cystic structure in pituitary is stable and could be Rathke's cleft " cyst versus a cystic pituitary microadenoma.  By 11/2018 MRI white matter lesions are stable, as well as cystic structure. These are likely incidental findings. Repeat MRI at in 2-3 years or sooner if new complaints.   7. Dorsal hand cystic lesion is episodic and long standing. Could see hand surgeon at any point if desired/ if persistent. Patient's son has myotonic dystrophy thought to be paternally inherited (his father had this condition and patient reports she tested negative).    RTC 6 months

## 2020-05-13 ENCOUNTER — OFFICE VISIT (OUTPATIENT)
Dept: NEUROLOGY | Facility: CLINIC | Age: 55
End: 2020-05-13
Payer: OTHER GOVERNMENT

## 2020-05-13 VITALS
HEIGHT: 62 IN | DIASTOLIC BLOOD PRESSURE: 85 MMHG | WEIGHT: 165 LBS | SYSTOLIC BLOOD PRESSURE: 151 MMHG | HEART RATE: 79 BPM | BODY MASS INDEX: 30.36 KG/M2

## 2020-05-13 DIAGNOSIS — F41.8 DEPRESSION WITH ANXIETY: ICD-10-CM

## 2020-05-13 DIAGNOSIS — G62.9 POLYNEUROPATHY: Primary | ICD-10-CM

## 2020-05-13 DIAGNOSIS — Q89.2: ICD-10-CM

## 2020-05-13 DIAGNOSIS — E53.8 B12 DEFICIENCY: ICD-10-CM

## 2020-05-13 PROCEDURE — 99214 PR OFFICE/OUTPT VISIT, EST, LEVL IV, 30-39 MIN: ICD-10-PCS | Mod: 95,,, | Performed by: PSYCHIATRY & NEUROLOGY

## 2020-05-13 PROCEDURE — 99214 OFFICE O/P EST MOD 30 MIN: CPT | Mod: 95,,, | Performed by: PSYCHIATRY & NEUROLOGY

## 2020-05-13 RX ORDER — LANOLIN ALCOHOL/MO/W.PET/CERES
1000 CREAM (GRAM) TOPICAL DAILY
COMMUNITY

## 2020-05-13 NOTE — PROGRESS NOTES
The patient location is: home  The chief complaint leading to consultation is:pain  Visit type: audiovisual  Total time spent with patient:   Each patient to whom he or she provides medical services by telemedicine is:  (1) informed of the relationship between the physician and patient and the respective role of any other health care provider with respect to management of the patient; and (2) notified that he or she may decline to receive medical services by telemedicine and may withdraw from such care at any time.        HPI:Krystal Centeno is a 52 y.o. female with left 4th finger pain and numbness progressing to left hand (especially medial hand/forearm) numbness. Exam showed left arm sensory changes. Some cervical radicular symptoms possible and dorsal hand cyst which is resolved.   2015 EMG left arm normal.   MRI C spine shows no NF narrowing or stenosis. 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 She can move her fingers and this resolves after a few seconds. No injury.   She does continue to have some variable pain. No neck pain. NO cold sensation or blood flow problems noted  [facility]/radicular symptoms prior-  UDLCE done prior    Not bothered by cystic hand lesion- not noted currently by patient    Review of Systems   Constitutional: Negative for fever.   HENT: Negative for nosebleeds.    Eyes: Negative for double vision.   Respiratory: Negative for hemoptysis.    Cardiovascular: Negative for chest pain.   Gastrointestinal: Negative for blood in stool.   Genitourinary:  Negative for hematuria.   Musculoskeletal: Positive for back pain.   Skin: Negative for rash.   Neurological: Negative for headaches.   Endo/Heme/Allergies: Does not bruise/bleed easily.     Exam:  Gen Appearance, well developed/nourished in no apparent distress    Neuro:  MS: Awake, alert,  Sustains attention. Recent/remote memory intact, Language is full to spontaneous speech/comprehension. Fund of Knowledge is full  CN: PERRL, Extraoccular movements and visual fields are full. Normal facial  strength  Motor: Normal bulk, moves all extremities well  Gait: Normal stance    The remainder of the exam is limited due to telemedicine nature of the visit        5/2016 MRI brain (repeat study) Stable cystic structure within the posterior sella causing some mild mass effect upon the adjacent pituitary gland, measuring approximately 5.5 x 3.2 mm.  Findings are most suggestive for either a Rathke's cleft cyst versus a cystic pituitary microadenoma.    Age advanced flair hyperintensity throughout the supratentorial white matter the differential to include sequela of chronic microvascular ischemic disease, vasculitis or other demyelinating disease process.      MRI C spine: Mild spondylosis of the cervical spine without evidence for central canal stenosis or neural foraminal narrowing.    The marrow signal is slightly heterogenous on the T1-weighted images. This is a nonspecific finding and can be seen in marrow replacement processes are well defined disease states such as anemia. Correlation with lab reported it is is recommended.        EMG: Normal EMG of the left arm and normal NCS of the   bilateral arms. No evidence of entrapement neuropathy, plexopathy   or radiculopathy on this study    4/2016 EMG/NCS: Impression:  Abnormal Study secondary to the Presence of:    1. 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 Lumbar Radiculopathy at L5 Bilaterally     1/2016 MRI L spine: Disc disease at L5    MRI brain  "2018: Stable nonenhancing cystic structure in the posterior sella measuring 5 x 3 mm.  No new lesion detected.    Continued age advanced T2/FLAIR signal abnormality throughout the supratentorial white matter without corresponding diffusion restriction.  While this could simply represent age advanced chronic microvascular ischemic change, underlying demyelinating disease process not entirely excluded.    Labs:5/2017 and 2018 B12 well corrected    Assessment/Plan: Krystal Centeno is a 54 y.o. female with left 4th finger pain and numbness progressing to left hand (especially medial hand/forearm) numbness. Exam showed left arm sensory changes. Some cervical radicular symptoms possible and dorsal hand cyst which is resolved.   2015 EMG left arm normal.   2015 MRI C spine showed no NF narrowing or stenosis. 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 Noting a few days of warm sensation in the distal right arm. No trauma or pain. Will need EMG/NCS arms and exam if this fails to improve in the next few weeks (she will notify if so). Rest arm after 20 minutes use  And consider brace use.  1. Continue Cymbalta for neuropathic complaints, as well as her anxiety and depression. Continue Gabapentin for neuropathy, as well as LE pain ([facility]). She also uses CBD cream and compound cream [facility].    2. Continue B12 supplementation for lower normal levels prior. Follow B12 level at the next visit (after Covid-19 pandemic)  3. Some rheumatological diagnosis could be associated with neuropathy. She saw rheumatology prior for "rheumatism- unspecified fibrositis"- injections focally with rheumatology helps her hand pain (which is pain more than numbness at this point).  -[facility] " who [facility] her to pain management  4. Initially, her symptoms of numbness in the LE improved after back surgery.  [facility]. She also goes to PT PRN, which is helpful.   -[facility] for lumbar pain/radicular symptoms recently and is pending DULCE currently  5. Repeat Pituitary labs were normal in 11/2018 and in 2016. Cystic structure in pituitary is stable and could be Rathke's cleft cyst versus a cystic pituitary microadenoma.  By 11/2018 MRI white matter lesions are stable, as well as cystic structure. These are likely incidental findings. Repeat MRI at 2-3 years or sooner if new complaints.   7. Dorsal hand cystic lesion is episodic and long standing. Could see hand surgeon at any point if desired/ if persistent. Patient's son has myotonic dystrophy thought to be paternally inherited (his father had this condition and patient reports she tested negative).    RTC 6 months

## 2020-11-16 ENCOUNTER — OFFICE VISIT (OUTPATIENT)
Dept: NEUROLOGY | Facility: CLINIC | Age: 55
End: 2020-11-16
Payer: OTHER GOVERNMENT

## 2020-11-16 ENCOUNTER — LAB VISIT (OUTPATIENT)
Dept: LAB | Facility: HOSPITAL | Age: 55
End: 2020-11-16
Attending: PSYCHIATRY & NEUROLOGY
Payer: OTHER GOVERNMENT

## 2020-11-16 VITALS
WEIGHT: 174.19 LBS | HEIGHT: 62 IN | TEMPERATURE: 98 F | DIASTOLIC BLOOD PRESSURE: 72 MMHG | HEART RATE: 74 BPM | BODY MASS INDEX: 32.05 KG/M2 | SYSTOLIC BLOOD PRESSURE: 138 MMHG | RESPIRATION RATE: 14 BRPM

## 2020-11-16 DIAGNOSIS — R29.898 LEFT ARM WEAKNESS: ICD-10-CM

## 2020-11-16 DIAGNOSIS — E53.8 B12 DEFICIENCY: ICD-10-CM

## 2020-11-16 DIAGNOSIS — Q89.2: ICD-10-CM

## 2020-11-16 DIAGNOSIS — G62.9 POLYNEUROPATHY: ICD-10-CM

## 2020-11-16 DIAGNOSIS — E53.8 B12 DEFICIENCY: Primary | ICD-10-CM

## 2020-11-16 DIAGNOSIS — M54.16 LUMBAR RADICULAR PAIN: ICD-10-CM

## 2020-11-16 LAB — VIT B12 SERPL-MCNC: >2000 PG/ML (ref 210–950)

## 2020-11-16 PROCEDURE — 36415 COLL VENOUS BLD VENIPUNCTURE: CPT

## 2020-11-16 PROCEDURE — 99214 OFFICE O/P EST MOD 30 MIN: CPT | Mod: S$PBB,,, | Performed by: PSYCHIATRY & NEUROLOGY

## 2020-11-16 PROCEDURE — 99214 PR OFFICE/OUTPT VISIT, EST, LEVL IV, 30-39 MIN: ICD-10-PCS | Mod: S$PBB,,, | Performed by: PSYCHIATRY & NEUROLOGY

## 2020-11-16 PROCEDURE — 99999 PR PBB SHADOW E&M-EST. PATIENT-LVL IV: ICD-10-PCS | Mod: PBBFAC,,, | Performed by: PSYCHIATRY & NEUROLOGY

## 2020-11-16 PROCEDURE — 99999 PR PBB SHADOW E&M-EST. PATIENT-LVL IV: CPT | Mod: PBBFAC,,, | Performed by: PSYCHIATRY & NEUROLOGY

## 2020-11-16 PROCEDURE — 82607 VITAMIN B-12: CPT

## 2020-11-16 PROCEDURE — 99214 OFFICE O/P EST MOD 30 MIN: CPT | Mod: PBBFAC | Performed by: PSYCHIATRY & NEUROLOGY

## 2020-11-16 RX ORDER — SENNOSIDES 25 MG/1
TABLET, FILM COATED ORAL DAILY PRN
COMMUNITY

## 2020-11-16 NOTE — PROGRESS NOTES
"      HPI:Krystal Centeno is a 55 y.o. female    Here for routine follow up  She states for the past few months, she has been noticing some "weakness" in the left arm and hand, mostly the arm. This is episodic and not daily and compared to her initial symptoms 5 years ago, symptoms or mild and lingering. Not worse  She states she has no difficulty raising the arm or using the hand. She instead feels she has dropped things in her left hand on occasion. No weakness with hands up, or buttoning clothes.  No numbness in the left hand.  No neck pain.  No weakness in the face.     Remains on  Cymbalta for anxiety and neuropathy pain  As well as gabapentin  Pain is unchanged in the feet  At the last visit: right arm warm sensation for few days and this resolved  Still taking B12.  [facility] to Dr Olmos) this year and she had some improvement     Not bothered by cystic hand lesion- still not noted currently by patient.     Review of Systems   Constitutional: Negative for fever.   HENT: Negative for nosebleeds.    Eyes: Negative for double vision.   Respiratory: Negative for hemoptysis.    Cardiovascular: Negative for chest pain.   Gastrointestinal: Negative for blood in stool.   Genitourinary: Negative for hematuria.   Musculoskeletal: Negative for falls.   Skin: Negative for rash.   Neurological: Negative for seizures.   Endo/Heme/Allergies: Does not bruise/bleed easily.   Psychiatric/Behavioral: The patient does not have insomnia.      Exam:  Gen Appearance, well developed/nourished in no apparent distress  CV: 2+ distal pulses with no edema or swelling  Neuro:  MS: Awake, alert,  Sustains attention. Recent/remote memory intact, Language is full to spontaneous speech//comprehension. Fund of Knowledge is full  CN: Optic discs are flat with normal vasculature, PERRL, Extraoccular movements and visual fields are full. Normal facial sensation and strength, chronic right sided hearing " loss (seen by ENT prior/ diagnosed with Meniere's disease)  Tongue and Palate are midline and strong. Shoulder Shrug symmetric and strong.  Motor: Normal bulk, tone, no abnormal movements. 5/5 strength bilateral upper/lower extremities with 1+ reflexes no clonus  Sensory: symmetric to temp, and vibration and pin  Cerebellar: Finger-nose,Heal-shin, Rapid alternating movements intact  Gait: normal stance, no ataxia         5/2016 MRI brain (repeat study) Stable cystic structure within the posterior sella causing some mild mass effect upon the adjacent pituitary gland, measuring approximately 5.5 x 3.2 mm.  Findings are most suggestive for either a Rathke's cleft cyst versus a cystic pituitary microadenoma.    Age advanced flair hyperintensity throughout the supratentorial white matter the differential to include sequela of chronic microvascular ischemic disease, vasculitis or other demyelinating disease process.      MRI C spine: Mild spondylosis of the cervical spine without evidence for central canal stenosis or neural foraminal narrowing.    The marrow signal is slightly heterogenous on the T1-weighted images. This is a nonspecific finding and can be seen in marrow replacement processes are well defined disease states such as anemia. Correlation with lab reported it is is recommended.        EMG: Normal EMG of the left arm and normal NCS of the   bilateral arms. No evidence of entrapement neuropathy, plexopathy   or radiculopathy on this study    4/2016 EMG/NCS: Impression:  Abnormal Study secondary to the Presence of:    1. 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 Lumbar Radiculopathy at L5 Bilaterally     1/2016 MRI L spine: Disc disease at L5    MRI brain 2018: Stable nonenhancing cystic structure in the posterior sella measuring 5 x 3 mm.  No new lesion detected.    Continued age advanced T2/FLAIR signal abnormality throughout the supratentorial white matter without corresponding diffusion  "restriction.  While this could simply represent age advanced chronic microvascular ischemic change, underlying demyelinating disease process not entirely excluded.    Labs:5/2017 and 2018 B12 well corrected    Assessment/Plan: Krystal Centeno is a 55 y.o. female with left 4th finger pain and numbness progressing to left hand (especially medial hand/forearm) numbness. Exam showed left arm sensory changes. Some cervical radicular symptoms possible and dorsal hand cyst which is resolved.   2015 EMG left arm normal.   2015 MRI C spine showed no NF narrowing or stenosis. 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 Noting episodic dropping of things in the left arm/hand without numbness or radicular pain or neck pain. Exam is normal. This is actually improved overall over time. Monitor and consider updating EMG/NCS if worse or new symptoms     2. Continues Cymbalta for neuropathic complaints, as well as her anxiety and depression. Continues Gabapentin for neuropathy, as well as LE pain ([facility]). She also uses CBD cream and compound cream per the [facility].      3. Continues B12 supplementation for lower normal levels prior. Follow B12 level now    4. Some rheumatological diagnosis could be associated with neuropathy. She saw rheumatology prior for "rheumatism- unspecified fibrositis"- injections focally with rheumatology helped her hand pain (which is pain more than numbness at this point).  -[facility] who [facility] her [facility]    5. Initially, her symptoms of numbness in the LE improved after back surgery.  [facility]. She also goes to PT PRN, which is helpful.   -[facility] for lumbar pain/radicular symptoms recently and " had improved with DULCE with Dr Olmos this year    6. Repeat Pituitary labs were normal in 11/2018 and in 2016. Cystic structure in pituitary is stable and could be Rathke's cleft cyst versus a cystic pituitary microadenoma.  By 11/2018 MRI white matter lesions are stable, as well as cystic structure. These are likely incidental findings. Repeat MRI brain next year or sooner if new complaints.     7. Dorsal hand cystic lesion is episodic and long standing. Could see hand surgeon at any point if desired/ if persistent or worse. Patient's son has myotonic dystrophy thought to be paternally inherited (his father had this condition and patient reports she tested negative).    RTC 6 months

## 2021-05-17 ENCOUNTER — OFFICE VISIT (OUTPATIENT)
Dept: NEUROLOGY | Facility: CLINIC | Age: 56
End: 2021-05-17
Payer: OTHER GOVERNMENT

## 2021-05-17 VITALS
HEIGHT: 62 IN | DIASTOLIC BLOOD PRESSURE: 86 MMHG | WEIGHT: 184.75 LBS | RESPIRATION RATE: 14 BRPM | SYSTOLIC BLOOD PRESSURE: 152 MMHG | BODY MASS INDEX: 34 KG/M2 | HEART RATE: 62 BPM

## 2021-05-17 DIAGNOSIS — G62.9 POLYNEUROPATHY: ICD-10-CM

## 2021-05-17 DIAGNOSIS — E23.7 PITUITARY ABNORMALITY: Primary | ICD-10-CM

## 2021-05-17 DIAGNOSIS — R90.82 WHITE MATTER ABNORMALITY ON MRI OF BRAIN: ICD-10-CM

## 2021-05-17 DIAGNOSIS — E53.8 B12 DEFICIENCY: ICD-10-CM

## 2021-05-17 PROCEDURE — 99213 OFFICE O/P EST LOW 20 MIN: CPT | Mod: PBBFAC | Performed by: PSYCHIATRY & NEUROLOGY

## 2021-05-17 PROCEDURE — 99214 PR OFFICE/OUTPT VISIT, EST, LEVL IV, 30-39 MIN: ICD-10-PCS | Mod: S$PBB,,, | Performed by: PSYCHIATRY & NEUROLOGY

## 2021-05-17 PROCEDURE — 99999 PR PBB SHADOW E&M-EST. PATIENT-LVL III: CPT | Mod: PBBFAC,,, | Performed by: PSYCHIATRY & NEUROLOGY

## 2021-05-17 PROCEDURE — 99214 OFFICE O/P EST MOD 30 MIN: CPT | Mod: S$PBB,,, | Performed by: PSYCHIATRY & NEUROLOGY

## 2021-05-17 PROCEDURE — 99999 PR PBB SHADOW E&M-EST. PATIENT-LVL III: ICD-10-PCS | Mod: PBBFAC,,, | Performed by: PSYCHIATRY & NEUROLOGY

## 2021-05-21 ENCOUNTER — HOSPITAL ENCOUNTER (OUTPATIENT)
Dept: RADIOLOGY | Facility: HOSPITAL | Age: 56
Discharge: HOME | End: 2021-05-21
Attending: PSYCHIATRY & NEUROLOGY
Payer: OTHER GOVERNMENT

## 2021-05-21 DIAGNOSIS — R90.82 WHITE MATTER ABNORMALITY ON MRI OF BRAIN: ICD-10-CM

## 2021-05-21 DIAGNOSIS — E23.7 PITUITARY ABNORMALITY: ICD-10-CM

## 2021-05-21 PROCEDURE — 70553 MRI BRAIN W WO CONTRAST: ICD-10-PCS | Mod: 26,,, | Performed by: RADIOLOGY

## 2021-05-21 PROCEDURE — 25500020 PHARM REV CODE 255: Performed by: PSYCHIATRY & NEUROLOGY

## 2021-05-21 PROCEDURE — 70553 MRI BRAIN STEM W/O & W/DYE: CPT | Mod: 26,,, | Performed by: RADIOLOGY

## 2021-05-21 PROCEDURE — A9585 GADOBUTROL INJECTION: HCPCS | Performed by: PSYCHIATRY & NEUROLOGY

## 2021-05-21 PROCEDURE — 70553 MRI BRAIN STEM W/O & W/DYE: CPT | Mod: TC

## 2021-05-21 RX ORDER — GADOBUTROL 604.72 MG/ML
8 INJECTION INTRAVENOUS
Status: COMPLETED | OUTPATIENT
Start: 2021-05-21 | End: 2021-05-21

## 2021-05-21 RX ADMIN — GADOBUTROL 8 ML: 604.72 INJECTION INTRAVENOUS at 03:05

## 2021-11-18 ENCOUNTER — OFFICE VISIT (OUTPATIENT)
Dept: NEUROLOGY | Facility: CLINIC | Age: 56
End: 2021-11-18
Payer: OTHER GOVERNMENT

## 2021-11-18 VITALS
HEART RATE: 74 BPM | SYSTOLIC BLOOD PRESSURE: 128 MMHG | HEIGHT: 62 IN | BODY MASS INDEX: 33.68 KG/M2 | DIASTOLIC BLOOD PRESSURE: 72 MMHG | WEIGHT: 183 LBS | RESPIRATION RATE: 16 BRPM

## 2021-11-18 DIAGNOSIS — E23.7 PITUITARY ABNORMALITY: ICD-10-CM

## 2021-11-18 DIAGNOSIS — R90.82 WHITE MATTER ABNORMALITY ON MRI OF BRAIN: ICD-10-CM

## 2021-11-18 DIAGNOSIS — M25.562 CHRONIC PAIN OF LEFT KNEE: Primary | ICD-10-CM

## 2021-11-18 DIAGNOSIS — E53.8 B12 DEFICIENCY: ICD-10-CM

## 2021-11-18 DIAGNOSIS — G89.29 CHRONIC PAIN OF LEFT KNEE: Primary | ICD-10-CM

## 2021-11-18 DIAGNOSIS — M54.16 LUMBAR RADICULAR PAIN: ICD-10-CM

## 2021-11-18 PROCEDURE — 99999 PR PBB SHADOW E&M-EST. PATIENT-LVL III: ICD-10-PCS | Mod: PBBFAC,,, | Performed by: PSYCHIATRY & NEUROLOGY

## 2021-11-18 PROCEDURE — 99999 PR PBB SHADOW E&M-EST. PATIENT-LVL III: CPT | Mod: PBBFAC,,, | Performed by: PSYCHIATRY & NEUROLOGY

## 2021-11-18 PROCEDURE — 99214 OFFICE O/P EST MOD 30 MIN: CPT | Mod: S$PBB,,, | Performed by: PSYCHIATRY & NEUROLOGY

## 2021-11-18 PROCEDURE — 99213 OFFICE O/P EST LOW 20 MIN: CPT | Mod: PBBFAC | Performed by: PSYCHIATRY & NEUROLOGY

## 2021-11-18 PROCEDURE — 99214 PR OFFICE/OUTPT VISIT, EST, LEVL IV, 30-39 MIN: ICD-10-PCS | Mod: S$PBB,,, | Performed by: PSYCHIATRY & NEUROLOGY

## 2023-04-24 ENCOUNTER — OFFICE VISIT (OUTPATIENT)
Dept: NEUROLOGY | Facility: CLINIC | Age: 58
End: 2023-04-24
Payer: OTHER GOVERNMENT

## 2023-04-24 VITALS
WEIGHT: 160.25 LBS | SYSTOLIC BLOOD PRESSURE: 148 MMHG | RESPIRATION RATE: 14 BRPM | DIASTOLIC BLOOD PRESSURE: 78 MMHG | HEIGHT: 62 IN | HEART RATE: 68 BPM | BODY MASS INDEX: 29.49 KG/M2

## 2023-04-24 DIAGNOSIS — G62.9 POLYNEUROPATHY: ICD-10-CM

## 2023-04-24 DIAGNOSIS — E53.8 B12 DEFICIENCY: ICD-10-CM

## 2023-04-24 DIAGNOSIS — E23.7 PITUITARY ABNORMALITY: Primary | ICD-10-CM

## 2023-04-24 DIAGNOSIS — M54.16 LUMBAR RADICULAR PAIN: ICD-10-CM

## 2023-04-24 PROCEDURE — 99999 PR PBB SHADOW E&M-EST. PATIENT-LVL IV: ICD-10-PCS | Mod: PBBFAC,,, | Performed by: PSYCHIATRY & NEUROLOGY

## 2023-04-24 PROCEDURE — 99214 OFFICE O/P EST MOD 30 MIN: CPT | Mod: PBBFAC | Performed by: PSYCHIATRY & NEUROLOGY

## 2023-04-24 PROCEDURE — 99214 OFFICE O/P EST MOD 30 MIN: CPT | Mod: S$PBB,,, | Performed by: PSYCHIATRY & NEUROLOGY

## 2023-04-24 PROCEDURE — 99999 PR PBB SHADOW E&M-EST. PATIENT-LVL IV: CPT | Mod: PBBFAC,,, | Performed by: PSYCHIATRY & NEUROLOGY

## 2023-04-24 PROCEDURE — 99214 PR OFFICE/OUTPT VISIT, EST, LEVL IV, 30-39 MIN: ICD-10-PCS | Mod: S$PBB,,, | Performed by: PSYCHIATRY & NEUROLOGY

## 2023-04-24 RX ORDER — ATORVASTATIN CALCIUM 40 MG/1
20 TABLET, FILM COATED ORAL DAILY
COMMUNITY

## 2023-04-24 NOTE — PROGRESS NOTES
HPI:Krystal Centeno is a 57 y.o. female     Here for follow up    Last visit 2021      Symptoms of dropping things with the left hand are still resolved    Cystic hand lesion not noted    Numbness in the feet continues/ stable    Continues Cymbalta and gabapentin for this which helps    CBD cream used still as well      Still taking B12.      Lumbar pain is episodic and sometimes severe. She feels she is tolerating this and she treats any severe pain with heat and changing positions      Mood is good      Review of Systems   Constitutional:  Negative for fever.   HENT:  Negative for nosebleeds.    Eyes:  Negative for double vision.   Respiratory:  Negative for hemoptysis.    Cardiovascular:  Negative for leg swelling.   Gastrointestinal:  Negative for blood in stool.   Genitourinary:  Negative for hematuria.   Musculoskeletal:  Positive for back pain.   Skin:  Negative for rash.   Neurological:  Positive for sensory change.   Endo/Heme/Allergies:  Does not bruise/bleed easily.   Exam:  Gen Appearance, well developed/nourished in no apparent distress  CV: 2+ distal pulses with no edema or swelling  Neuro:  MS: Awake, alert,  Sustains attention. Recent/remote memory intact, Language is full to spontaneous speech//comprehension. Fund of Knowledge is full  CN: Optic discs are flat with normal vasculature, PERRL, Extraoccular movements and visual fields are full. Normal facial sensation and strength, chronic right sided hearing loss (seen by ENT prior/ diagnosed with Meniere's disease)  Tongue and Palate are midline and strong. Shoulder Shrug symmetric and strong.  Motor: Normal bulk, tone, no abnormal movements. 5/5 strength bilateral upper/lower extremities with 1+ reflexes no clonus  Sensory: symmetric to temp, and vibration  Cerebellar: Finger-nose,Heal-shin, Rapid alternating movements intact  Gait: normal stance, no ataxia         5/2016 MRI brain (repeat study) Stable cystic structure within the posterior  sella causing some mild mass effect upon the adjacent pituitary gland, measuring approximately 5.5 x 3.2 mm.  Findings are most suggestive for either a Rathke's cleft cyst versus a cystic pituitary microadenoma.    Age advanced flair hyperintensity throughout the supratentorial white matter the differential to include sequela of chronic microvascular ischemic disease, vasculitis or other demyelinating disease process.      MRI C spine: Mild spondylosis of the cervical spine without evidence for central canal stenosis or neural foraminal narrowing.    The marrow signal is slightly heterogenous on the T1-weighted images. This is a nonspecific finding and can be seen in marrow replacement processes are well defined disease states such as anemia. Correlation with lab reported it is is recommended.        EMG: Normal EMG of the left arm and normal NCS of the   bilateral arms. No evidence of entrapement neuropathy, plexopathy   or radiculopathy on this study    4/2016 EMG/NCS: Impression:  Abnormal Study secondary to the Presence of:    1. 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 Lumbar Radiculopathy at L5 Bilaterally     1/2016 MRI L spine: Disc disease at L5    MRI brain 2018: Stable nonenhancing cystic structure in the posterior sella measuring 5 x 3 mm.  No new lesion detected.    Continued age advanced T2/FLAIR signal abnormality throughout the supratentorial white matter without corresponding diffusion restriction.  While this could simply represent age advanced chronic microvascular ischemic change, underlying demyelinating disease process not entirely excluded.    5/2021 MRI brain: Stable, small nonenhancing cystic structure along the posterior pituitary without change when compared with the prior study of November 20, 2018.  No new lesion.     Multifocal increased signal intensity within the periventricular white matter best demonstrated on T2/FLAIR imaging compatible with stable advanced  "small vessel ischemic.  As noted previously, an underlying demyelinating process is not included    Labs:5/2017 and 2018 B12 well corrected    2021 B12 well corrected    Assessment/Plan: Krystal Centeno is a 57 y.o. female with left 4th finger pain and numbness progressing to left hand (especially medial hand/forearm) numbness. Exam showed left arm sensory changes. Some cervical radicular symptoms possible and dorsal hand cyst which is resolved.   2015 EMG left arm normal.   2015 MRI C spine showed no NF narrowing or stenosis. 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 Noting episodic dropping of things in the left arm/hand without numbness or radicular pain or neck pain. Exam is normal. This is currently resolved. Monitor and consider updating EMG/NCS if worse or new symptoms     2. Continues Cymbalta for neuropathic complaints, as well as her anxiety and depression. Continues Gabapentin for neuropathy, as well as LE pain ([facility]). She also uses CBD cream and compound cream per the [facility].      3. Continues B12 supplementation for lower normal levels prior/ well corrected. Reduced to 1/2 supplement prior.    4. Some rheumatological diagnosis could be associated with neuropathy. She saw rheumatology prior for "rheumatism- unspecified fibrositis"- injections focally with rheumatology helped her hand pain  -[facility] who [facility] her to [facility]    5. Initially, her symptoms of numbness in the LE improved after back surgery.  [facility]. She can continue  PT PRN, which is helpful.   -[facility] for lumbar pain/radicular symptoms recently and had improved with DULCE with Dr Olmos prior. Advised she see him PRN    6. " Repeat Pituitary labs were normal in 11/2018 and in 2016. Cystic structure in pituitary is stable and could be Rathke's cleft cyst versus a cystic pituitary microadenoma.  By 11/2018 MRI white matter lesions are stable, as well as cystic structure. These are likely incidental findings. Repeat MRI brain in 2021 unchanged. Will repeat now and then probably  space longer or PRN/ Sooner if new symptoms.     7. Dorsal hand cystic lesion is episodic and long standing, episodic- currently not active. Could see hand surgeon at any point if desired/ if persistent or worse. Ganglion cyst     8. Note:Patient's son has myotonic dystrophy thought to be paternally inherited (his father had this condition and patient reports she tested negative).      RTC 2 years (but patient asked to call for the above results)

## 2023-05-08 ENCOUNTER — HOSPITAL ENCOUNTER (OUTPATIENT)
Dept: RADIOLOGY | Facility: HOSPITAL | Age: 58
Discharge: HOME | End: 2023-05-08
Attending: PSYCHIATRY & NEUROLOGY
Payer: OTHER GOVERNMENT

## 2023-05-08 DIAGNOSIS — E23.7 PITUITARY ABNORMALITY: ICD-10-CM

## 2023-05-08 PROCEDURE — A9585 GADOBUTROL INJECTION: HCPCS | Performed by: PSYCHIATRY & NEUROLOGY

## 2023-05-08 PROCEDURE — 70553 MRI BRAIN STEM W/O & W/DYE: CPT | Mod: 26,,, | Performed by: RADIOLOGY

## 2023-05-08 PROCEDURE — 70553 MRI BRAIN W WO CONTRAST: ICD-10-PCS | Mod: 26,,, | Performed by: RADIOLOGY

## 2023-05-08 PROCEDURE — 70553 MRI BRAIN STEM W/O & W/DYE: CPT | Mod: TC

## 2023-05-08 PROCEDURE — 25500020 PHARM REV CODE 255: Performed by: PSYCHIATRY & NEUROLOGY

## 2023-05-08 RX ORDER — GADOBUTROL 604.72 MG/ML
7 INJECTION INTRAVENOUS
Status: COMPLETED | OUTPATIENT
Start: 2023-05-08 | End: 2023-05-08

## 2023-05-08 RX ADMIN — GADOBUTROL 10 ML: 604.72 INJECTION INTRAVENOUS at 08:05
